# Patient Record
Sex: FEMALE | Race: WHITE | URBAN - NONMETROPOLITAN AREA
[De-identification: names, ages, dates, MRNs, and addresses within clinical notes are randomized per-mention and may not be internally consistent; named-entity substitution may affect disease eponyms.]

---

## 2018-11-06 PROBLEM — R07.89 CHEST TIGHTNESS: Status: ACTIVE | Noted: 2018-11-06

## 2018-11-06 NOTE — PROGRESS NOTES
nourished, no acute distress  NEUROLOGICAL: Alert and oriented x3  PSYCH: Normal mood and affect   SKIN: Warm and dry, without lesions  HEENT: Normocephalic, atraumatic, Sclera non-icteric, mucous membranes moist  NECK: supple, JVP normal, thyroid not enlarged   CAROTID: Normal upstroke, no bruits  CARDIAC: Normal PMI, regular rate and rhythm, normal S1S2, no murmur, rub  RESPIRATORY: Normal respiratory effort, clear to auscultation bilaterally  EXTREMITIES: No cyanosis, clubbing or edema, palpable pulses bilaterally   MUSCULOSKELETAL: No joint swelling or tenderness, no chest wall tenderness  GASTROINTESTINAL:  soft, non-tender, no bruit    Labs:  Lab Review   No visits with results within 2 Month(s) from this visit. Latest known visit with results is:   Admission on 10/17/2015, Discharged on 10/18/2015   Component Date Value    WBC 10/17/2015 14.3*    RBC 10/17/2015 4.33     Hemoglobin 10/17/2015 11.6*    Hematocrit 10/17/2015 35.9*    MCV 10/17/2015 82.9     MCH 10/17/2015 26.7     MCHC 10/17/2015 32.2     RDW 10/17/2015 13.6     Platelets  219     MPV 10/17/2015 7.2     Specimen Status 10/17/2015 ARIELLA     RPR 10/17/2015 Non-reactive     Amphetamine Screen, Urine 10/17/2015 Neg     Barbiturate Screen, Ur 10/17/2015 Neg     Benzodiazepine Screen, U* 10/17/2015 Neg     Cannabinoid Scrn, Ur 10/17/2015 Neg     Cocaine Metabolite Scree* 10/17/2015 Neg     Opiate Scrn, Ur 10/17/2015 Neg     PCP Screen, Urine 10/17/2015 Neg     Methadone Screen, Urine 10/17/2015 Neg     Propoxyphene Scrn, Ur 10/17/2015 Neg     pH, UA 10/17/2015 7.0     Drug Screen Comment: 10/17/2015 see below     Glucose 10/17/2015 93     Strep Group B Ag 2015 negative          Imaging:  I have reviewed the below testing personally:    EK18  Sinus  Bradycardia   Early repolarization     4/10/18  ECHO  Study Conclusions    - Left ventricle:  The cavity size was normal. Wall thickness was    normal.

## 2018-11-09 ENCOUNTER — OFFICE VISIT (OUTPATIENT)
Dept: CARDIOLOGY CLINIC | Age: 38
End: 2018-11-09
Payer: COMMERCIAL

## 2018-11-09 VITALS
BODY MASS INDEX: 22.88 KG/M2 | HEART RATE: 64 BPM | WEIGHT: 134 LBS | HEIGHT: 64 IN | DIASTOLIC BLOOD PRESSURE: 62 MMHG | SYSTOLIC BLOOD PRESSURE: 98 MMHG

## 2018-11-09 DIAGNOSIS — R07.89 CHEST TIGHTNESS: ICD-10-CM

## 2018-11-09 DIAGNOSIS — R00.2 PALPITATIONS: ICD-10-CM

## 2018-11-09 PROCEDURE — 93000 ELECTROCARDIOGRAM COMPLETE: CPT | Performed by: INTERNAL MEDICINE

## 2018-11-09 PROCEDURE — 99204 OFFICE O/P NEW MOD 45 MIN: CPT | Performed by: INTERNAL MEDICINE

## 2018-11-09 RX ORDER — M-VIT,TX,IRON,MINS/CALC/FOLIC 27MG-0.4MG
1 TABLET ORAL DAILY
COMMUNITY

## 2018-11-09 NOTE — LETTER
unspecified heart disease but lived until 81years of age. Medical History:      Diagnosis Date    Anemia     Infertility        Surgical History:      Procedure Laterality Date    ECTOPIC PREGNANCY SURGERY  11/2009    ECTOPIC PREGNANCY SURGERY      LIPOSUCTION      WISDOM TOOTH EXTRACTION  2003       Social History:  Social History     Social History    Marital status:      Spouse name: N/A    Number of children: N/A    Years of education: N/A     Occupational History    Not on file. Social History Main Topics    Smoking status: Never Smoker    Smokeless tobacco: Never Used    Alcohol use No    Drug use: No    Sexual activity: Yes     Partners: Male     Other Topics Concern    Not on file     Social History Narrative    No narrative on file        Family History:  No evidence for sudden cardiac death or premature CAD. Family History   Problem Relation Age of Onset    Birth Defects Maternal Uncle     Mental Retardation Maternal Uncle         Down Syndrome    Stroke Maternal Uncle     Heart Disease Paternal Grandmother     Heart Disease Paternal Grandfather        Medications:  [x] Medications and dosages reviewed. Prior to Admission medications    Medication Sig Start Date End Date Taking? Authorizing Provider   Multiple Vitamins-Minerals (THERAPEUTIC MULTIVITAMIN-MINERALS) tablet Take 1 tablet by mouth daily   Yes Historical Provider, MD   Cholecalciferol (VITAMIN D3) 5000 units TABS Take 2 each by mouth   Yes Historical Provider, MD   ibuprofen (ADVIL;MOTRIN) 800 MG tablet Take 1 tablet by mouth every 8 hours as needed for Pain 10/17/15   Blanco Davila MD   ibuprofen (ADVIL;MOTRIN) 800 MG tablet Take 1 tablet by mouth every 6 hours as needed for Pain (For mild pain level 1-3). 1/27/11 1/27/12  John Rojas RN   Prenatal Vit-Fe Sulfate-FA (PRENATAL VITAMIN PO) Take  by mouth.     Historical Provider, MD       Allergies:  No known allergies     Review of Systems:

## 2018-11-11 ASSESSMENT — ENCOUNTER SYMPTOMS
WHEEZING: 0
VOMITING: 0
ABDOMINAL PAIN: 0
COUGH: 0
DIARRHEA: 0
CHEST TIGHTNESS: 0
NAUSEA: 0
CONSTIPATION: 0
SHORTNESS OF BREATH: 0

## 2018-11-12 ENCOUNTER — OFFICE VISIT (OUTPATIENT)
Dept: FAMILY MEDICINE CLINIC | Age: 38
End: 2018-11-12
Payer: COMMERCIAL

## 2018-11-12 VITALS
HEIGHT: 64 IN | BODY MASS INDEX: 22.53 KG/M2 | TEMPERATURE: 98 F | WEIGHT: 132 LBS | OXYGEN SATURATION: 99 % | RESPIRATION RATE: 16 BRPM | DIASTOLIC BLOOD PRESSURE: 70 MMHG | HEART RATE: 74 BPM | SYSTOLIC BLOOD PRESSURE: 120 MMHG

## 2018-11-12 DIAGNOSIS — Z23 NEED FOR INFLUENZA VACCINATION: ICD-10-CM

## 2018-11-12 DIAGNOSIS — Z00.00 ANNUAL PHYSICAL EXAM: Primary | ICD-10-CM

## 2018-11-12 DIAGNOSIS — Z76.89 ENCOUNTER TO ESTABLISH CARE: ICD-10-CM

## 2018-11-12 PROCEDURE — 90471 IMMUNIZATION ADMIN: CPT | Performed by: CLINICAL NURSE SPECIALIST

## 2018-11-12 PROCEDURE — 81002 URINALYSIS NONAUTO W/O SCOPE: CPT | Performed by: CLINICAL NURSE SPECIALIST

## 2018-11-12 PROCEDURE — 90686 IIV4 VACC NO PRSV 0.5 ML IM: CPT | Performed by: CLINICAL NURSE SPECIALIST

## 2018-11-12 PROCEDURE — 99385 PREV VISIT NEW AGE 18-39: CPT | Performed by: CLINICAL NURSE SPECIALIST

## 2018-11-12 ASSESSMENT — PATIENT HEALTH QUESTIONNAIRE - PHQ9
SUM OF ALL RESPONSES TO PHQ QUESTIONS 1-9: 0
2. FEELING DOWN, DEPRESSED OR HOPELESS: 0
1. LITTLE INTEREST OR PLEASURE IN DOING THINGS: 0
SUM OF ALL RESPONSES TO PHQ QUESTIONS 1-9: 0
SUM OF ALL RESPONSES TO PHQ9 QUESTIONS 1 & 2: 0

## 2018-11-12 ASSESSMENT — ENCOUNTER SYMPTOMS
BACK PAIN: 0
COLOR CHANGE: 0
RHINORRHEA: 0

## 2018-11-12 NOTE — PATIENT INSTRUCTIONS
Have a test for colon cancer at age 48. You may have one of several tests. If you are younger than 48, you may need a test earlier if you have any risk factors. Risk factors include whether you already had a precancerous polyp removed from your colon or whether your parent, brother, sister, or child has had colon cancer. For women  · Breast exam and mammogram. Talk to your doctor about when you should have a clinical breast exam and a mammogram. Medical experts differ on whether and how often women under 50 should have these tests. Your doctor can help you decide what is right for you. · Pap test and pelvic exam. Begin Pap tests at age 24. A Pap test is the best way to find cervical cancer. The test often is part of a pelvic exam. Ask how often to have this test.  · Tests for sexually transmitted infections (STIs). Ask whether you should have tests for STIs. You may be at risk if you have sex with more than one person, especially if your partners do not wear condoms. For men  · Tests for sexually transmitted infections (STIs). Ask whether you should have tests for STIs. You may be at risk if you have sex with more than one person, especially if you do not wear a condom. · Testicular cancer exam. Ask your doctor whether you should check your testicles regularly. · Prostate exam. Talk to your doctor about whether you should have a blood test (called a PSA test) for prostate cancer. Experts differ on whether and when men should have this test. Some experts suggest it if you are older than 39 and are -American or have a father or brother who got prostate cancer when he was younger than 72. When should you call for help? Watch closely for changes in your health, and be sure to contact your doctor if you have any problems or symptoms that concern you. Where can you learn more? Go to https://celeste.health-partners. org and sign in to your CompleteSet account.  Enter P072 in the iCIMS box to learn more about \"Well Visit, Ages 25 to 48: Care Instructions. \"     If you do not have an account, please click on the \"Sign Up Now\" link. Current as of: March 29, 2018  Content Version: 11.8  © 0631-3993 Healthwise, Incorporated. Care instructions adapted under license by [facility] ([facility]). If you have questions about a medical condition or this instruction, always ask your healthcare professional. [address] any warranty or liability for your use of this information.

## 2018-11-12 NOTE — COMMUNICATION BODY
Systolic function was normal. The estimated ejection    fraction was in the range of 60% to 65%. Wall motion was normal;    there were no regional wall motion abnormalities. - Mitral valve: Mild regurgitation.  - Right ventricle: Systolic function was normal.  - Pulmonic valve: Peak gradient (S): 7mm Hg. Impression/Recommendations    Ms. Dallin Toledo is a 41 y.o. female patient with:    1. Chest tightness    2. Palpitations      Kely Marquez has had normal stress test and more recently echocardiogram in the past. She is very low cardiac risk and her symptoms are nonexertional, admitting to increased anxiety with them. She is agreeable to a thirty day event monitor to attempt to capture while symptomatic. We discussed potential triggers as above and otherwise she leads a very clean lifestyle. Further recommendations pending [facility]OT. Orders Placed This Encounter   Procedures    Cardiac event monitor     30 days     Standing Status:   Future     Standing Expiration Date:   11/9/2019    EKG 12 lead     Order Specific Question:   Reason for Exam?     Answer:   Chest pain     Return if symptoms worsen or fail to improve. Patient Instructions   30 day monitor  Call if you have questions or concerns  We will call you with results        Thank you for allowing me to participate in the care of your patient. Please do not hesitate to call. Jasemet Ross D.O., [facility]  Interventional Cardiology     o: [phone]  [address] [place], [place] [address] [address], [address] [facility]    NOTE:  This report was transcribed using voice recognition software. Every effort was made to ensure accuracy; however, inadvertent computerized transcription errors may be present. Scribe's Attestation: This note was scribed in the presence of Dr. Jane Orozco DO by HERSON Ayers.

## 2018-11-15 DIAGNOSIS — Z00.00 ANNUAL PHYSICAL EXAM: ICD-10-CM

## 2018-11-15 LAB
A/G RATIO: 1.9 (ref 1.1–2.2)
ALBUMIN SERPL-MCNC: 4.7 G/DL (ref 3.4–5)
ALP BLD-CCNC: 53 U/L (ref 40–129)
ALT SERPL-CCNC: 16 U/L (ref 10–40)
ANION GAP SERPL CALCULATED.3IONS-SCNC: 16 MMOL/L (ref 3–16)
AST SERPL-CCNC: 21 U/L (ref 15–37)
BASOPHILS ABSOLUTE: 0 K/UL (ref 0–0.2)
BASOPHILS RELATIVE PERCENT: 0.5 %
BILIRUB SERPL-MCNC: 0.4 MG/DL (ref 0–1)
BUN BLDV-MCNC: 9 MG/DL (ref 7–20)
CALCIUM SERPL-MCNC: 9.3 MG/DL (ref 8.3–10.6)
CHLORIDE BLD-SCNC: 102 MMOL/L (ref 99–110)
CHOLESTEROL, TOTAL: 215 MG/DL (ref 0–199)
CO2: 21 MMOL/L (ref 21–32)
CREAT SERPL-MCNC: 0.7 MG/DL (ref 0.6–1.1)
EOSINOPHILS ABSOLUTE: 0 K/UL (ref 0–0.6)
EOSINOPHILS RELATIVE PERCENT: 0.4 %
GFR AFRICAN AMERICAN: >60
GFR NON-AFRICAN AMERICAN: >60
GLOBULIN: 2.5 G/DL
GLUCOSE BLD-MCNC: 99 MG/DL (ref 70–99)
HCT VFR BLD CALC: 38.9 % (ref 36–48)
HDLC SERPL-MCNC: 82 MG/DL (ref 40–60)
HEMOGLOBIN: 12.7 G/DL (ref 12–16)
LDL CHOLESTEROL CALCULATED: 119 MG/DL
LYMPHOCYTES ABSOLUTE: 1.9 K/UL (ref 1–5.1)
LYMPHOCYTES RELATIVE PERCENT: 28.4 %
MCH RBC QN AUTO: 26.5 PG (ref 26–34)
MCHC RBC AUTO-ENTMCNC: 32.6 G/DL (ref 31–36)
MCV RBC AUTO: 81.3 FL (ref 80–100)
MONOCYTES ABSOLUTE: 0.5 K/UL (ref 0–1.3)
MONOCYTES RELATIVE PERCENT: 7 %
NEUTROPHILS ABSOLUTE: 4.3 K/UL (ref 1.7–7.7)
NEUTROPHILS RELATIVE PERCENT: 63.7 %
PDW BLD-RTO: 14.6 % (ref 12.4–15.4)
PLATELET # BLD: 245 K/UL (ref 135–450)
PMV BLD AUTO: 8.6 FL (ref 5–10.5)
POTASSIUM SERPL-SCNC: 4.5 MMOL/L (ref 3.5–5.1)
RBC # BLD: 4.78 M/UL (ref 4–5.2)
SODIUM BLD-SCNC: 139 MMOL/L (ref 136–145)
TOTAL PROTEIN: 7.2 G/DL (ref 6.4–8.2)
TRIGL SERPL-MCNC: 68 MG/DL (ref 0–150)
TSH REFLEX: 2.31 UIU/ML (ref 0.27–4.2)
VITAMIN D 25-HYDROXY: 81.9 NG/ML
VLDLC SERPL CALC-MCNC: 14 MG/DL
WBC # BLD: 6.7 K/UL (ref 4–11)

## 2018-11-19 LAB
HPV COMMENT: NORMAL
HPV TYPE 16: NOT DETECTED
HPV TYPE 18: NOT DETECTED
HPVOH (OTHER TYPES): NOT DETECTED

## 2018-12-13 PROCEDURE — 93268 ECG RECORD/REVIEW: CPT | Performed by: INTERNAL MEDICINE

## 2018-12-14 ENCOUNTER — TELEPHONE (OUTPATIENT)
Dept: CARDIOLOGY CLINIC | Age: 38
End: 2018-12-14

## 2019-09-17 ENCOUNTER — OFFICE VISIT (OUTPATIENT)
Dept: FAMILY MEDICINE CLINIC | Age: 39
End: 2019-09-17
Payer: COMMERCIAL

## 2019-09-17 VITALS
HEART RATE: 88 BPM | BODY MASS INDEX: 22.5 KG/M2 | RESPIRATION RATE: 16 BRPM | SYSTOLIC BLOOD PRESSURE: 122 MMHG | WEIGHT: 131.8 LBS | HEIGHT: 64 IN | DIASTOLIC BLOOD PRESSURE: 78 MMHG | OXYGEN SATURATION: 98 % | TEMPERATURE: 98.6 F

## 2019-09-17 DIAGNOSIS — R00.2 PALPITATION: ICD-10-CM

## 2019-09-17 DIAGNOSIS — F43.9 SITUATIONAL STRESS: ICD-10-CM

## 2019-09-17 DIAGNOSIS — R07.89 CHEST TIGHTNESS OR PRESSURE: Primary | ICD-10-CM

## 2019-09-17 PROCEDURE — 99214 OFFICE O/P EST MOD 30 MIN: CPT | Performed by: CLINICAL NURSE SPECIALIST

## 2019-09-17 PROCEDURE — 93000 ELECTROCARDIOGRAM COMPLETE: CPT | Performed by: CLINICAL NURSE SPECIALIST

## 2019-09-17 ASSESSMENT — ENCOUNTER SYMPTOMS
VOMITING: 0
DIARRHEA: 0
NAUSEA: 0
COUGH: 0
ABDOMINAL PAIN: 0
SPUTUM PRODUCTION: 0
CONSTIPATION: 0
SHORTNESS OF BREATH: 0
CHEST TIGHTNESS: 0
WHEEZING: 0

## 2019-09-17 NOTE — PATIENT INSTRUCTIONS
chest.  ? Sweating. ? Shortness of breath. ? Nausea or vomiting. ? Pain, pressure, or a strange feeling in your back, neck, jaw, or upper belly or in one or both shoulders or arms. ? Lightheadedness or sudden weakness. ? A fast or irregular heartbeat. After you call 911, the  may tell you to chew 1 adult-strength or 2 to 4 low-dose aspirin. Wait for an ambulance. Do not try to drive yourself.    Call your doctor today if:    · You have any trouble breathing.     · Your chest pain gets worse.     · You are dizzy or lightheaded, or you feel like you may faint.     · You are not getting better as expected.     · You are having new or different chest pain. Where can you learn more? Go to https://JOORpeProFibrixeb.AviantLogic. org and sign in to your Aceable account. Enter A120 in the Idun [employer]aceuticals box to learn more about \"Chest Pain: Care Instructions. \"     If you do not have an account, please click on the \"Sign Up Now\" link. Current as of: September 23, 2018  Content Version: 12.1  © 5212-6199 HiWired. Care instructions adapted under license by [facility] ([facility]). If you have questions about a medical condition or this instruction, always ask your healthcare professional. [address] any warranty or liability for your use of this information. Patient Education        Palpitations: Care Instructions  Your Care Instructions    Heart palpitations are the uncomfortable sensation that your heart is beating fast or irregularly. You might feel pounding or fluttering in your chest. It might feel like your heart is skipping a beat. Although palpitations may be caused by a heart problem, they also occur because of stress, fatigue, or use of alcohol, caffeine, or nicotine. Many medicines, including diet pills, antihistamines, decongestants, and some herbal products, can cause heart palpitations. Nearly everyone has palpitations from time to time.   Depending on

## 2019-09-23 DIAGNOSIS — R00.2 PALPITATION: ICD-10-CM

## 2019-09-23 DIAGNOSIS — R07.89 CHEST TIGHTNESS OR PRESSURE: ICD-10-CM

## 2019-09-23 LAB
A/G RATIO: 1.5 (ref 1.1–2.2)
ALBUMIN SERPL-MCNC: 4 G/DL (ref 3.4–5)
ALP BLD-CCNC: 50 U/L (ref 40–129)
ALT SERPL-CCNC: 11 U/L (ref 10–40)
ANION GAP SERPL CALCULATED.3IONS-SCNC: 11 MMOL/L (ref 3–16)
AST SERPL-CCNC: 17 U/L (ref 15–37)
BASOPHILS ABSOLUTE: 0.1 K/UL (ref 0–0.2)
BASOPHILS RELATIVE PERCENT: 1.2 %
BILIRUB SERPL-MCNC: <0.2 MG/DL (ref 0–1)
BUN BLDV-MCNC: 13 MG/DL (ref 7–20)
CALCIUM SERPL-MCNC: 8.8 MG/DL (ref 8.3–10.6)
CHLORIDE BLD-SCNC: 103 MMOL/L (ref 99–110)
CO2: 23 MMOL/L (ref 21–32)
CREAT SERPL-MCNC: 0.7 MG/DL (ref 0.6–1.1)
D DIMER: <200 NG/ML DDU (ref 0–229)
EOSINOPHILS ABSOLUTE: 0 K/UL (ref 0–0.6)
EOSINOPHILS RELATIVE PERCENT: 0.7 %
GFR AFRICAN AMERICAN: >60
GFR NON-AFRICAN AMERICAN: >60
GLOBULIN: 2.7 G/DL
GLUCOSE BLD-MCNC: 110 MG/DL (ref 70–99)
HCT VFR BLD CALC: 37.2 % (ref 36–48)
HEMOGLOBIN: 12.2 G/DL (ref 12–16)
LYMPHOCYTES ABSOLUTE: 1.4 K/UL (ref 1–5.1)
LYMPHOCYTES RELATIVE PERCENT: 28.3 %
MCH RBC QN AUTO: 26.6 PG (ref 26–34)
MCHC RBC AUTO-ENTMCNC: 32.7 G/DL (ref 31–36)
MCV RBC AUTO: 81.3 FL (ref 80–100)
MONOCYTES ABSOLUTE: 0.3 K/UL (ref 0–1.3)
MONOCYTES RELATIVE PERCENT: 6.7 %
NEUTROPHILS ABSOLUTE: 3.1 K/UL (ref 1.7–7.7)
NEUTROPHILS RELATIVE PERCENT: 63.1 %
PDW BLD-RTO: 14.3 % (ref 12.4–15.4)
PLATELET # BLD: 202 K/UL (ref 135–450)
PMV BLD AUTO: 8.6 FL (ref 5–10.5)
POTASSIUM SERPL-SCNC: 4.4 MMOL/L (ref 3.5–5.1)
RBC # BLD: 4.58 M/UL (ref 4–5.2)
SODIUM BLD-SCNC: 137 MMOL/L (ref 136–145)
TOTAL PROTEIN: 6.7 G/DL (ref 6.4–8.2)
TSH REFLEX: 2.94 UIU/ML (ref 0.27–4.2)
WBC # BLD: 5 K/UL (ref 4–11)

## 2019-11-19 LAB
A/G RATIO: 2.3 (ref 1.1–2.2)
ALBUMIN SERPL-MCNC: 5 G/DL (ref 3.4–5)
ALP BLD-CCNC: 46 U/L (ref 40–129)
ALT SERPL-CCNC: 10 U/L (ref 10–40)
ANION GAP SERPL CALCULATED.3IONS-SCNC: 13 MMOL/L (ref 3–16)
AST SERPL-CCNC: 14 U/L (ref 15–37)
BILIRUB SERPL-MCNC: 0.4 MG/DL (ref 0–1)
BUN BLDV-MCNC: 14 MG/DL (ref 7–20)
CALCIUM SERPL-MCNC: 9.3 MG/DL (ref 8.3–10.6)
CHLORIDE BLD-SCNC: 102 MMOL/L (ref 99–110)
CHOLESTEROL, TOTAL: 198 MG/DL (ref 0–199)
CO2: 23 MMOL/L (ref 21–32)
CREAT SERPL-MCNC: 0.8 MG/DL (ref 0.6–1.1)
GFR AFRICAN AMERICAN: >60
GFR NON-AFRICAN AMERICAN: >60
GLOBULIN: 2.2 G/DL
GLUCOSE BLD-MCNC: 95 MG/DL (ref 70–99)
HDLC SERPL-MCNC: 87 MG/DL (ref 40–60)
LDL CHOLESTEROL CALCULATED: 100 MG/DL
POTASSIUM SERPL-SCNC: 4.9 MMOL/L (ref 3.5–5.1)
SODIUM BLD-SCNC: 138 MMOL/L (ref 136–145)
TOTAL PROTEIN: 7.2 G/DL (ref 6.4–8.2)
TRIGL SERPL-MCNC: 53 MG/DL (ref 0–150)
VLDLC SERPL CALC-MCNC: 11 MG/DL

## 2019-11-20 LAB
ESTIMATED AVERAGE GLUCOSE: 122.6 MG/DL
HBA1C MFR BLD: 5.9 %

## 2019-12-06 ENCOUNTER — OFFICE VISIT (OUTPATIENT)
Dept: FAMILY MEDICINE CLINIC | Age: 39
End: 2019-12-06
Payer: COMMERCIAL

## 2019-12-06 VITALS
HEART RATE: 97 BPM | WEIGHT: 129.8 LBS | TEMPERATURE: 97.9 F | DIASTOLIC BLOOD PRESSURE: 80 MMHG | HEIGHT: 64 IN | OXYGEN SATURATION: 98 % | BODY MASS INDEX: 22.16 KG/M2 | SYSTOLIC BLOOD PRESSURE: 110 MMHG

## 2019-12-06 DIAGNOSIS — R73.03 PRE-DIABETES: ICD-10-CM

## 2019-12-06 DIAGNOSIS — J02.8 VIRAL SORE THROAT: Primary | ICD-10-CM

## 2019-12-06 DIAGNOSIS — Z23 FLU VACCINE NEED: ICD-10-CM

## 2019-12-06 DIAGNOSIS — B97.89 VIRAL SORE THROAT: Primary | ICD-10-CM

## 2019-12-06 LAB — S PYO AG THROAT QL: NORMAL

## 2019-12-06 PROCEDURE — 87880 STREP A ASSAY W/OPTIC: CPT | Performed by: CLINICAL NURSE SPECIALIST

## 2019-12-06 PROCEDURE — 99214 OFFICE O/P EST MOD 30 MIN: CPT | Performed by: CLINICAL NURSE SPECIALIST

## 2019-12-06 RX ORDER — GLUCOSAMINE HCL/CHONDROITIN SU 500-400 MG
CAPSULE ORAL
Qty: 100 STRIP | Refills: 1 | Status: SHIPPED | OUTPATIENT
Start: 2019-12-06

## 2019-12-06 ASSESSMENT — PATIENT HEALTH QUESTIONNAIRE - PHQ9
SUM OF ALL RESPONSES TO PHQ QUESTIONS 1-9: 0
1. LITTLE INTEREST OR PLEASURE IN DOING THINGS: 0
SUM OF ALL RESPONSES TO PHQ QUESTIONS 1-9: 0
2. FEELING DOWN, DEPRESSED OR HOPELESS: 0
SUM OF ALL RESPONSES TO PHQ9 QUESTIONS 1 & 2: 0

## 2019-12-06 ASSESSMENT — ENCOUNTER SYMPTOMS
WHEEZING: 0
SORE THROAT: 1
CHEST TIGHTNESS: 0
SHORTNESS OF BREATH: 0
VOMITING: 0
COUGH: 1

## 2019-12-08 ASSESSMENT — ENCOUNTER SYMPTOMS
CHANGE IN BOWEL HABIT: 1
CONSTIPATION: 0
ABDOMINAL PAIN: 0

## 2019-12-09 PROCEDURE — 90471 IMMUNIZATION ADMIN: CPT | Performed by: CLINICAL NURSE SPECIALIST

## 2019-12-09 PROCEDURE — 90686 IIV4 VACC NO PRSV 0.5 ML IM: CPT | Performed by: CLINICAL NURSE SPECIALIST

## 2020-01-30 ENCOUNTER — OFFICE VISIT (OUTPATIENT)
Dept: FAMILY MEDICINE CLINIC | Age: 40
End: 2020-01-30
Payer: COMMERCIAL

## 2020-01-30 VITALS
SYSTOLIC BLOOD PRESSURE: 120 MMHG | HEART RATE: 68 BPM | OXYGEN SATURATION: 98 % | BODY MASS INDEX: 21.97 KG/M2 | DIASTOLIC BLOOD PRESSURE: 72 MMHG | WEIGHT: 128 LBS

## 2020-01-30 DIAGNOSIS — Z3A.01 LESS THAN 8 WEEKS GESTATION OF PREGNANCY: ICD-10-CM

## 2020-01-30 LAB
CONTROL: POSITIVE
GONADOTROPIN, CHORIONIC (HCG) QUANT: 4475 MIU/ML
PREGNANCY TEST URINE, POC: POSITIVE

## 2020-01-30 PROCEDURE — 81025 URINE PREGNANCY TEST: CPT | Performed by: CLINICAL NURSE SPECIALIST

## 2020-01-30 PROCEDURE — 99213 OFFICE O/P EST LOW 20 MIN: CPT | Performed by: CLINICAL NURSE SPECIALIST

## 2020-01-30 ASSESSMENT — ENCOUNTER SYMPTOMS
VOMITING: 0
CONSTIPATION: 0
ABDOMINAL PAIN: 0
WHEEZING: 0
COUGH: 0
NAUSEA: 0
DIARRHEA: 0
CHEST TIGHTNESS: 0
SHORTNESS OF BREATH: 0

## 2020-01-30 ASSESSMENT — PATIENT HEALTH QUESTIONNAIRE - PHQ9
SUM OF ALL RESPONSES TO PHQ9 QUESTIONS 1 & 2: 0
1. LITTLE INTEREST OR PLEASURE IN DOING THINGS: 0
SUM OF ALL RESPONSES TO PHQ QUESTIONS 1-9: 0
2. FEELING DOWN, DEPRESSED OR HOPELESS: 0
SUM OF ALL RESPONSES TO PHQ QUESTIONS 1-9: 0

## 2020-01-30 NOTE — PATIENT INSTRUCTIONS
Blood work to confirm pregnancy    Keep appointment with OB/GYN in [place]     Discussed list of medications to take/avoid (list given)    Discussed what to expect for 6-10 weeks     Discussed when to call up to 20 weeks   Patient Education        Pregnancy Test (HCG): About This Test  What is it? A pregnancy test can check if the hormone hCG is in your blood or urine. Your body makes this hormone when you are pregnant. Many women use home pregnancy tests to find out if they are pregnant. This care sheet focuses on a pregnancy test done in a doctor's office or clinic. Why is this test done? This test will show if you are pregnant or not. A urine test checks if hCG is in your urine. A blood test can also measure the amount of hCG. If you are pregnant, your doctor will use the test results as a guide to care for you and your growing baby. How can you prepare for the test?  You don't need to do anything before the test.  What happens during the test?  A urine or blood test for pregnancy can be done in your doctor's office, clinic, or lab. · Blood test: A health professional takes a sample of your blood. · Urine test: You catch urine in a cup given to you by a health professional. When you are finished, you give the cup back. What else should you know about the test?  · This test may not show that you are pregnant if you are very early in your pregnancy. · If your tests show you aren't pregnant, but you or your doctor thinks you may be too early in your pregnancy, you may need another test.  How long does the test take? · The test will take just a few minutes. · Results from a urine test may be available right away. Blood test results may take a few days. What happens after the test?  · You can go back to your usual activities right away. If you are pregnant, you will get more information from your doctor. Follow-up care is a key part of your treatment and safety.  Be sure to make and go to all appointments, and call your doctor if you are having problems. It's also a good idea to keep a list of the medicines you take. Ask your doctor when you can expect to have your test results. Where can you learn more? Go to https://celeste.GraphLab. org and sign in to your AppVault account. Enter D128 in the Hipmunk box to learn more about \"Pregnancy Test (HCG): About This Test.\"     If you do not have an account, please click on the \"Sign Up Now\" link. Current as of: May 29, 2019  Content Version: 12.3  © 9803-8354 91 Wireless. Care instructions adapted under license by [facility] ([facility]). If you have questions about a medical condition or this instruction, always ask your healthcare professional. [address] any warranty or liability for your use of this information. Patient Education        Weeks 6 to 10 of Your Pregnancy: Care Instructions  Your Care Instructions    Congratulations on your pregnancy. This is an exciting and important time for you. During the first 6 to 10 weeks of your pregnancy, your body goes through many changes. Your baby grows very fast, even though you cannot feel it yet. You may start to notice that you feel different, both in your body and your emotions. Because each woman's pregnancy is unique, there is no right way to feel. You may feel the healthiest you have ever been, or you may feel tired or sick to your stomach (\"morning sickness\"). These early weeks are a time to make healthy choices and to eat the best foods for you and your baby. This care sheet will give you some ideas. This is also a good time to think about birth defects testing. These are tests done during pregnancy to look for possible problems with the baby. First trimester tests for birth defects can be done between 8 and 17 weeks of pregnancy, depending on the test. Talk with your doctor about what kinds of tests are available.   Follow-up care is a key part of your temperature can be harmful to your baby. So if you want to use a sauna or hot tub, be sure to talk to your doctor about how to use it safely. [place] with morning sickness  · Sip small amounts of water, juices, or shakes. Try drinking between meals, not with meals. · Eat 5 or 6 small meals a day. Try dry toast or crackers when you first get up, and eat breakfast a little later. · Avoid spicy, greasy, and fatty foods. · When you feel sick, open your windows or go for a short walk to get fresh air. · Try nausea wristbands. These help some women. · Tell your doctor if you think your prenatal vitamins make you sick. Where can you learn more? Go to https://TenantrexpeBrainiac TVeb.BiTMICRO Networks Inc. org and sign in to your Aegis Lightwave account. Enter G112 in the Haloband box to learn more about \"Weeks 6 to 10 of Your Pregnancy: Care Instructions. \"     If you do not have an account, please click on the \"Sign Up Now\" link. Current as of: May 29, 2019  Content Version: 12.3  © 5405-9955 BioMarker Strategies. Care instructions adapted under license by [facility] ([facility]). If you have questions about a medical condition or this instruction, always ask your healthcare professional. [place] [address] any warranty or liability for your use of this information. Patient Education        Learning About When to Call Your Doctor During Pregnancy (Up to 20 Weeks)  Your Care Instructions    It's common to have concerns about what might be a problem during pregnancy. Although most pregnant women don't have any serious problems, it's important to know when to call your doctor if you have certain symptoms. These are general suggestions. Your doctor may give you some more information about when to call. When to call your doctor (up to 20 weeks)  Call 911 anytime you think you may need emergency care. For example, call if:  · You passed out (lost consciousness).   Call your doctor now or seek immediate medical care

## 2020-01-30 NOTE — PROGRESS NOTES
SUBJECTIVE:    Patient ID:  Bertha Corrigan is a 46 y.o. female      Patient is here for an acute visit to confirm pregnancy. She is estimating about 5 weeks gestation. She has a history gestation diabetes and hypertension. She is requesting a blood test to confirm and possibly see how far along she is. Reviewed current medications/supplements she is currently taking probiotics, collagen, coconut oil, prenatal vitamins with fish oil, vitamin D and magnesium, and kvng (apple cider gummy). Discussed medications to take/avoid during pregnancy. Instructed to avoid alcohol, excessive caffeine and nicotine. She is a G4, P2 with 1 miscarriage. Denies abdominal cramping, back pain or vaginal bleeding. Discussed what to expect for 6-10 weeks gestation and when call call/be evaluated. States she has an appointment scheduled with new OB/GYN in March. Current Outpatient Medications on File Prior to Visit   Medication Sig Dispense Refill    blood glucose monitor strips Test twice times a day & as needed for symptoms of irregular blood glucose. 100 strip 1    Cholecalciferol (VITAMIN D3) 5000 units TABS Take 2 each by mouth      Prenatal Vit-Fe Sulfate-FA (PRENATAL VITAMIN PO) Take  by mouth.  Multiple Vitamins-Minerals (THERAPEUTIC MULTIVITAMIN-MINERALS) tablet Take 1 tablet by mouth daily      ibuprofen (ADVIL;MOTRIN) 800 MG tablet Take 1 tablet by mouth every 8 hours as needed for Pain (Patient not taking: Reported on 1/30/2020) 40 tablet 1    ibuprofen (ADVIL;MOTRIN) 800 MG tablet Take 1 tablet by mouth every 6 hours as needed for Pain (For mild pain level 1-3). 40 tablet 1     No current facility-administered medications on file prior to visit.        Past Medical History:   Diagnosis Date    Anemia     Infertility      Past Surgical History:   Procedure Laterality Date    ECTOPIC PREGNANCY SURGERY  11/2009    ECTOPIC PREGNANCY SURGERY      LIPOSUCTION      WISDOM TOOTH EXTRACTION  2003     Family kg)   12/06/19 129 lb 12.8 oz (58.9 kg)   09/17/19 131 lb 12.8 oz (59.8 kg)       ASSESSMENT & PLAN:    1. Less than 8 weeks gestation of pregnancy  - HCG, Quantitative, Pregnancy; Future  - Blood work to confirm pregnancy  - Keep appointment with OB/GYN in [place]   - Discussed list of medications to take/avoid (list given)  - Discussed what to expect for 6-10 weeks  - Discussed when to call up to 20 weeks     2. Positive pregnancy test  - POCT urine pregnancy (positive)      Continue current treatment plan. Current Outpatient Medications   Medication Sig Dispense Refill    blood glucose monitor strips Test twice times a day & as needed for symptoms of irregular blood glucose. 100 strip 1    Cholecalciferol (VITAMIN D3) 5000 units TABS Take 2 each by mouth      Prenatal Vit-Fe Sulfate-FA (PRENATAL VITAMIN PO) Take  by mouth.  Multiple Vitamins-Minerals (THERAPEUTIC MULTIVITAMIN-MINERALS) tablet Take 1 tablet by mouth daily      ibuprofen (ADVIL;MOTRIN) 800 MG tablet Take 1 tablet by mouth every 8 hours as needed for Pain (Patient not taking: Reported on 1/30/2020) 40 tablet 1    ibuprofen (ADVIL;MOTRIN) 800 MG tablet Take 1 tablet by mouth every 6 hours as needed for Pain (For mild pain level 1-3). 40 tablet 1     No current facility-administered medications for this visit. Return if symptoms worsen or fail to improve, for less then 8 weeks, positive pregnancy test.    Neha Ennis received counseling on the following healthy behaviors: nutrition, exercise and medication adherence    Patient given educational materials on pregnancy    Discussed use, benefit, and side effects of prescribed medications. Barriers to medication compliance addressed. All patient questions answered. Pt voiced understanding. Call office if experience side effects from medications. Please note that some or all of this record was generated using voice recognition software.  If there are any questions about the content of

## 2020-02-13 ENCOUNTER — TELEPHONE (OUTPATIENT)
Dept: FAMILY MEDICINE CLINIC | Age: 40
End: 2020-02-13

## 2020-02-13 NOTE — TELEPHONE ENCOUNTER
States insurance no longer covers the test strips that were sent over. Will need new script for a new meter and test strips.

## 2020-02-14 RX ORDER — BLOOD-GLUCOSE METER
1 KIT MISCELLANEOUS DAILY
Qty: 1 KIT | Refills: 0 | Status: SHIPPED | OUTPATIENT
Start: 2020-02-14

## 2020-02-14 NOTE — TELEPHONE ENCOUNTER
Spoke with Patient about monitor and strips sent to pharmacy. No questions and or concerns thus far.

## 2020-02-17 ENCOUNTER — TELEPHONE (OUTPATIENT)
Dept: FAMILY MEDICINE CLINIC | Age: 40
End: 2020-02-17

## 2020-02-17 RX ORDER — LANCETS 28 GAUGE
1 EACH MISCELLANEOUS DAILY
Qty: 100 EACH | Refills: 3 | Status: SHIPPED | OUTPATIENT
Start: 2020-02-17

## 2020-02-17 RX ORDER — GLUCOSAMINE HCL/CHONDROITIN SU 500-400 MG
1 CAPSULE ORAL 2 TIMES DAILY
Qty: 100 STRIP | Refills: 0 | Status: SHIPPED | OUTPATIENT
Start: 2020-02-17

## 2020-02-17 NOTE — TELEPHONE ENCOUNTER
[facility] called stating that they need to clarify direction for the Freestyle Kit, and they also need a RX for the Lancets and test strips sent to them, to [facility] [phone]. Please call ruchi to advise @ [phone].   Pt was seen on Friday 2/14/2020

## 2020-04-01 ENCOUNTER — E-VISIT (OUTPATIENT)
Dept: FAMILY MEDICINE CLINIC | Age: 40
End: 2020-04-01
Payer: COMMERCIAL

## 2020-04-01 PROCEDURE — 99421 OL DIG E/M SVC 5-10 MIN: CPT | Performed by: CLINICAL NURSE SPECIALIST

## 2020-04-01 ASSESSMENT — LIFESTYLE VARIABLES: SMOKING_STATUS: NO, I'VE NEVER SMOKED

## 2020-09-14 ASSESSMENT — ENCOUNTER SYMPTOMS
CHEST TIGHTNESS: 0
VOMITING: 0
WHEEZING: 0
COUGH: 0
DIARRHEA: 0
NAUSEA: 0
SHORTNESS OF BREATH: 0
CONSTIPATION: 0
ABDOMINAL PAIN: 0

## 2020-09-14 NOTE — PROGRESS NOTES
SUBJECTIVE:    Patient ID:  Marylen Galeazzi is a 45 y.o. female      Patient is here for an acute visit to discuss possible up treatment for anxiety due to fear of flying. States ativan has worked well in the past.  Discussed risk, benefits and potential adverse affects of benzodiazepines (Ativan). Patient verbalizes understanding and is agreeable to treatment plan. State he in-laws live in [place] and are not it the best of health. States allergies are managed with over the counter medications as needed. Current Outpatient Medications on File Prior to Visit   Medication Sig Dispense Refill    Multiple Vitamins-Minerals (THERAPEUTIC MULTIVITAMIN-MINERALS) tablet Take 1 tablet by mouth daily      Cholecalciferol (VITAMIN D3) 5000 units TABS Take 2 each by mouth      FreeStyle Lancets MISC 1 each by Does not apply route daily 100 each 3    blood glucose monitor strips 1 strip by Other route 2 times daily Test BID E11.9. pt to use freestyle test strips. 100 strip 0    blood glucose test strips (ASCENSIA AUTODISC VI;ONE TOUCH ULTRA TEST VI) strip 1 each by In Vitro route daily As needed. 100 each 3    glucose monitoring kit (FREESTYLE) monitoring kit 1 kit by Does not apply route daily 1 kit 0    blood glucose monitor strips Test twice times a day & as needed for symptoms of irregular blood glucose. 100 strip 1    ibuprofen (ADVIL;MOTRIN) 800 MG tablet Take 1 tablet by mouth every 8 hours as needed for Pain (Patient not taking: Reported on 1/30/2020) 40 tablet 1    ibuprofen (ADVIL;MOTRIN) 800 MG tablet Take 1 tablet by mouth every 6 hours as needed for Pain (For mild pain level 1-3). 40 tablet 1    Prenatal Vit-Fe Sulfate-FA (PRENATAL VITAMIN PO) Take  by mouth. No current facility-administered medications on file prior to visit.        Past Medical History:   Diagnosis Date    Anemia     Infertility      Past Surgical History:   Procedure Laterality Date    ECTOPIC PREGNANCY SURGERY  11/2009  ECTOPIC PREGNANCY SURGERY      LIPOSUCTION      WISDOM TOOTH EXTRACTION  2003     Family History   Problem Relation Age of Onset    Birth Defects Maternal Uncle     Mental Retardation Maternal Uncle         Down Syndrome    Stroke Maternal Uncle     Heart Disease Paternal Grandmother     Heart Disease Paternal Grandfather      Social History     Socioeconomic History    Marital status:      Spouse name: Not on file    Number of children: Not on file    Years of education: Not on file    Highest education level: Not on file   Occupational History    Not on file   Social Needs    Financial resource strain: Not on file    Food insecurity     Worry: Not on file     Inability: Not on file    Transportation needs     Medical: Not on file     Non-medical: Not on file   Tobacco Use    Smoking status: Never Smoker    Smokeless tobacco: Never Used   Substance and Sexual Activity    Alcohol use: No    Drug use: No    Sexual activity: Yes     Partners: Male   Lifestyle    Physical activity     Days per week: Not on file     Minutes per session: Not on file    Stress: Not on file   Relationships    Social connections     Talks on phone: Not on file     Gets together: Not on file     Attends Buddhism service: Not on file     Active member of club or organization: Not on file     Attends meetings of clubs or organizations: Not on file     Relationship status: Not on file    Intimate partner violence     Fear of current or ex partner: Not on file     Emotionally abused: Not on file     Physically abused: Not on file     Forced sexual activity: Not on file   Other Topics Concern    Not on file   Social History Narrative    Not on file       Review of Systems   Constitutional: Negative for chills and fever. Eyes: Negative for visual disturbance. Respiratory: Negative for cough, chest tightness, shortness of breath and wheezing. Cardiovascular: Negative for chest pain and palpitations. 09/15/20 134 lb (60.8 kg)   01/30/20 128 lb (58.1 kg)   12/06/19 129 lb 12.8 oz (58.9 kg)       ASSESSMENT & PLAN:    1. Seasonal allergic rhinitis due to pollen  - Stable, continue over the counter medications as needed    2. Fear of flying  - LORazepam (ATIVAN) 1 MG tablet; Take 1 tablet by mouth daily as needed for Anxiety for up to 30 days. Dispense: 15 tablet; Refill: 0  - Discussed risk, benefits and potential adverse affects Ativan  - Take 0.5 to 1 mg 30-45 minutes prior to flight, may cause drowsiness      Continue current treatment plan. Current Outpatient Medications   Medication Sig Dispense Refill    LORazepam (ATIVAN) 1 MG tablet Take 1 tablet by mouth daily as needed for Anxiety for up to 30 days. 15 tablet 0    Multiple Vitamins-Minerals (THERAPEUTIC MULTIVITAMIN-MINERALS) tablet Take 1 tablet by mouth daily      Cholecalciferol (VITAMIN D3) 5000 units TABS Take 2 each by mouth      FreeStyle Lancets MISC 1 each by Does not apply route daily 100 each 3    blood glucose monitor strips 1 strip by Other route 2 times daily Test BID E11.9. pt to use freestyle test strips. 100 strip 0    blood glucose test strips (ASCENSIA AUTODISC VI;ONE TOUCH ULTRA TEST VI) strip 1 each by In Vitro route daily As needed. 100 each 3    glucose monitoring kit (FREESTYLE) monitoring kit 1 kit by Does not apply route daily 1 kit 0    blood glucose monitor strips Test twice times a day & as needed for symptoms of irregular blood glucose. 100 strip 1    ibuprofen (ADVIL;MOTRIN) 800 MG tablet Take 1 tablet by mouth every 8 hours as needed for Pain (Patient not taking: Reported on 1/30/2020) 40 tablet 1    ibuprofen (ADVIL;MOTRIN) 800 MG tablet Take 1 tablet by mouth every 6 hours as needed for Pain (For mild pain level 1-3). 40 tablet 1    Prenatal Vit-Fe Sulfate-FA (PRENATAL VITAMIN PO) Take  by mouth. No current facility-administered medications for this visit.        Return in about 6 months (around 3/15/2021), or if symptoms worsen or fail to improve, for [place], fear of flying. Shaylee received counseling on the following healthy behaviors: nutrition, exercise and medication adherence    Patient given educational materials on phobias     Discussed use, benefit, and side effects of prescribed medications. Barriers to medication compliance addressed. All patient questions answered. Pt voiced understanding. Call office if experience side effects from medications. Please note that some or all of this record was generated using voice recognition software. If there are any questions about the content of this document, please contact the author as some errors in transcription may have occurred.

## 2020-09-14 NOTE — PATIENT INSTRUCTIONS
Allergies are managed with over the counter medications     Discussed risk, benefits and potential adverse affects Ativan    Take 0.5 to 1 mg 30-45 minutes prior to flight, may cause drowsiness      Patient Education        Phobias: Care Instructions  Your Care Instructions  A phobia is an extreme fear of something that is not a real danger. We all live with fears, such as fear of an angry dog running toward you. It is normal to feel fear at the moment that you face real danger. But people with phobias have fears that interfere with their daily lives. They usually know that their fears are not based on real threats, but they feel that they are not able to control the fears. There are different types of phobias. Fear of being closed in a small space and fear of flying in an airplane are common phobias. You might be afraid of spiders, or fear being struck by lightning, or drowning. Fear of high places is another common phobia. These phobias can cause anxiety, panic, trembling, and a rapid heartbeat. Fear might make you act in a way that is not really needed, such as never leaving home or not climbing stairs. Just thinking about what you fear might make you feel ill. Many people who finish treatment of phobias are able to overcome their fears over time. If your fear gets in the way of your daily activities, your doctor may prescribe medicine and behavior therapy. Follow-up care is a key part of your treatment and safety. Be sure to make and go to all appointments, and call your doctor if you are having problems. It's also a good idea to know your test results and keep a list of the medicines you take. How can you care for yourself at home? · Take your medicines exactly as prescribed. Call your doctor if you think you are having a problem with your medicine. When you feel good, you may think you do not need your medicine, but it is important to keep taking it.   · Find a counselor you like and trust. Talk openly and honestly about your problems. Be willing to make some changes. · Get enough sleep. If you have problems sleeping:  ? Go to bed at the same time every night, and get up at the same time every morning. ? Keep your bedroom dark and free of noise. ? Do not drink anything with caffeine after 12:00 noon. ? Do not use over-the-counter sleeping pills. They can make your sleep restless. They may also interact with your medicine. ? Avoid alcohol. Drinking alcohol before bedtime may cause you to wake up in the middle of the night and have trouble falling back to sleep. · Eat a healthy, balanced diet to help prevent illness. · Get at least 30 minutes of exercise on most days of the week. Walking is a good choice. You also may want to do other activities, such as running, swimming, cycling, or playing tennis or team sports. Exercise can help you relieve stress and feel better. · Stay active. Try to do the things you usually enjoy doing, even if you do not feel like doing them. · Discuss the cause of your fears with a good friend or family member, or join a support group for people with similar problems. Talking about your fears with others sometimes relieves anxiety. · When you start to feel fearful, do something to get it off your mind, such as taking a walk. · Trust that you can improve your way of coping with these fears. What should you do if someone in your family has a phobia? · Learn about the phobia and the signs that symptoms are getting worse. · Remind your family member that you love him or her. · Make a plan with all family members about how to take care of your loved one when his or her fears are bad. Talk about your concerns and those of other family members. · Do not focus attention only on the family member who is in treatment. · Remind yourself that it will take time for changes to happen. · Do not blame yourself for the person's condition.   · Know your legal rights and the legal rights of your family member. · Take care of yourself. Stay involved with your own interests, such as your career, hobbies, and friends. · Use exercise, positive self-talk, relaxation, and deep breathing exercises to help lower your stress. · If you are having a hard time with your feelings and your interactions with your family member, talk with a counselor. When should you call for help? [id number] anytime you think you may need emergency care. For example, call if:  · You feel you cannot stop from hurting yourself or someone else. Watch closely for changes in your health, and be sure to contact your doctor if:  · You have trouble sleeping. · You feel anxious or depressed. · You have a sudden change in behavior. · You have trouble taking care of yourself, or you become confused when doing simple chores or tasks. · You start to use illegal drugs or drink alcohol heavily. · Your symptoms often upset your daily activities. Where can you learn more? Go to https://[employer]. org and sign in to your [employer] account. Enter B170 in the [employer] box to learn more about \"Phobias: Care Instructions. \"     If you do not have an account, please click on the \"Sign Up Now\" link. Current as of: January 31, 2020               Content Version: 12.5  © 1088-2568 [employer]. Care instructions adapted under license by [facility] ([facility]). If you have questions about a medical condition or this instruction, always ask your healthcare professional. [place] [address] any warranty or liability for your use of this information.

## 2020-09-15 ENCOUNTER — OFFICE VISIT (OUTPATIENT)
Dept: FAMILY MEDICINE CLINIC | Age: 40
End: 2020-09-15
Payer: COMMERCIAL

## 2020-09-15 VITALS
TEMPERATURE: 98.3 F | HEART RATE: 57 BPM | OXYGEN SATURATION: 99 % | SYSTOLIC BLOOD PRESSURE: 110 MMHG | DIASTOLIC BLOOD PRESSURE: 78 MMHG | BODY MASS INDEX: 22.88 KG/M2 | HEIGHT: 64 IN | WEIGHT: 134 LBS

## 2020-09-15 PROCEDURE — 99213 OFFICE O/P EST LOW 20 MIN: CPT | Performed by: CLINICAL NURSE SPECIALIST

## 2020-09-15 RX ORDER — LORAZEPAM 1 MG/1
1 TABLET ORAL DAILY PRN
Qty: 15 TABLET | Refills: 0 | Status: SHIPPED | OUTPATIENT
Start: 2020-09-15 | End: 2020-10-15